# Patient Record
(demographics unavailable — no encounter records)

---
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
## 2018-08-25 NOTE — ASMTCMCOM
CM Note

 

CM Note                       

Notes:

Pt presented to the Emergency Department with continued abdominal/back pain. Asked to see pt by 

KAY Black regarding follow up appointment with [facility]. Met with pt 


- pt to see Dr. Manriquez on Sept 17th. Pt already saw her PCP last Thursday and has a referral for 

her upcoming GI appt. Serena requesting CM move GI appointment up from Sept 17th given pt's 

continued symptoms and three recent ED visits.



Call placed to [facility] [phone]. Left message with answering service 

requesting [facility] call pt on Monday 08/27/18 to move pt appointment to soonest date 

possible. Update provided to pt. Pt also provided with [facility] and Case Management's 

phone numbers to call for any further issues changing appointment date/time. Pt verbalized 

understanding. CM available for any further issues or concerns.

 

Date Signed:  08/25/2018 03:03 PM

Electronically Signed By:Cheli Contreras RN

## 2018-08-25 NOTE — EDPHY
H & P


Time Seen by Provider: 08/25/18 10:24


HPI/ROS: 





CHIEF COMPLAINT:  Abdominal pain





HISTORY OF PRESENT ILLNESS:  22-year-old female presents to the emergency 

department with abdominal pain.  She states that she has had ongoing abdominal 

pain for years however worse over last 1 week.  She was seen in the emergency 

department 1 week ago and had imaging studies which was within normal limits.  

She denies urinary symptoms.  She has also been seen by her primary care 

provider 2 days ago and is scheduled to see a gastroenterologist.  She 

currently denies pain or chest or difficulty breathing.  No vomiting.  She has 

had some diarrhea and loose bowel movements over last 1 week.  No antibiotics.  

No recent travel.  No known ill contacts.





REVIEW OF SYSTEMS:


Constitutional:  No fever, no chills.


Eyes:  No double or blurry vision.


ENT:  No sore throat.


Respiratory:  No cough, no shortness of breath.


Cardiac:  No chest pain.


Gastrointestinal:  Abdominal pain.  No vomiting.  Diarrhea


Genitourinary:  No dysuria.


Musculoskeletal:  No neck or back pain.


Skin:  No rashes.


Neurological:  No headache.


Past Medical/Surgical History: 





Traumatic brain injury, left-sided deficits chronic


Social History: 





 recently moved here from [place]


Smoking Status: Never smoked


Physical Exam: 





General Appearance:  Alert, no distress.  Afebrile.


Eyes:  Pupils equal and round.  Extraocular motions are all intact.


ENT:  Mouth:  Mucous membranes moist.


Respiratory:  No wheezing, rhonchi, or rales, lungs are clear to auscultation.


Cardiovascular:  Regular rate and rhythm.


Gastrointestinal:  Abdomen is soft and nontender, no masses, no rebound or 

guarding, bowel sounds normal.  Mild bilateral CVA tenderness worse on the 

right compared to the left.


Neurological:  Alert and oriented x 3, cranial nerves II through XII grossly 

intact


Skin:  Warm and dry, no rashes.


Musculoskeletal:  Nontender to palpate along the cervical, thoracic or lumbar 

spine.  Neck is supple.


Extremities:  Full range of motion and no peripheral edema.


Psychiatric:  Patient is oriented X 3, there is no agitation.


Constitutional: 


 Initial Vital Signs











Temperature (C)  36.8 C   08/25/18 10:26


 


Heart Rate  82   08/25/18 10:26


 


Respiratory Rate  18   08/25/18 10:26


 


Blood Pressure  93/65 L  08/25/18 10:26


 


O2 Sat (%)  96   08/25/18 10:26








 











O2 Delivery Mode               Room Air














Allergies/Adverse Reactions: 


 





latex Allergy (Verified 08/25/18 10:24)


 








Home Medications: 














 Medication  Instructions  Recorded


 


Dicyclomine [Bentyl 20 MG (*)] 20 mg PO QID PRN #15 tab 08/20/18


 


Hydrocodone/APAP 5/325 [Norco 1 - 2 tab PO Q4 PRN #10 tab 08/20/18





5/325 (RX)]  


 


Cephalexin [Keflex] 500 mg PO QID #40 cap 08/25/18














Medical Decision Making





- Diagnostics


Imaging Results: 


 Imaging Impressions





Abdomen X-Ray  08/25/18 11:50


Impression: Constipation. No obstruction.











Imaging: I viewed and interpreted images myself


ED Course/Re-evaluation: 





22-year-old female presents to the emergency department with chronic abdominal 

pain.  Patient's abdominal exam is unremarkable.  I do not think repeat CT 

imaging is indicated.  Do not think ultrasound is indicated.





Urinalysis reveals pyelonephritis.  Urine cultures pending.  She was given 1 g 

of ceftriaxone IV and will be discharged with oral Keflex.





Remainder laboratory exams are unremarkable.  She has a scheduled appointment 

with gastroenterologist in a few weeks.   will see if they are able 

to arrange for a sooner follow-up appointment.





Patient feels comfortable being discharged home.  She had no episodes of 

vomiting in the emergency department.  She is hungry and eating and drinking in 

the emergency department.





X-rays were obtained per her request which revealed evidence of constipation 

without evidence of obstruction.  She was encouraged to use high-fiber diet.


Differential Diagnosis: 





Including but not limited to constipation, gastroenteritis, GERD, peptic ulcer 

disease, cholecystitis, cholelithiasis





- Data Points


Laboratory Results: 


 Laboratory Results





 08/25/18 10:35 





 08/25/18 10:35 





 











  08/25/18 08/25/18 08/25/18





  11:12 10:35 10:35


 


WBC      





    


 


RBC      





    


 


Hgb      





    


 


Hct      





    


 


MCV      





    


 


MCH      





    


 


MCHC      





    


 


RDW      





    


 


Plt Count      





    


 


MPV      





    


 


Neut % (Auto)      





    


 


Lymph % (Auto)      





    


 


Mono % (Auto)      





    


 


Eos % (Auto)      





    


 


Baso % (Auto)      





    


 


Nucleat RBC Rel Count      





    


 


Absolute Neuts (auto)      





    


 


Absolute Lymphs (auto)      





    


 


Absolute Monos (auto)      





    


 


Absolute Eos (auto)      





    


 


Absolute Basos (auto)      





    


 


Absolute Nucleated RBC      





    


 


Immature Gran %      





    


 


Immature Gran #      





    


 


Sodium      142 mEq/L mEq/L





     (135-145) 


 


Potassium      4.5 mEq/L mEq/L





     (3.3-5.0) 


 


Chloride      109 mEq/L mEq/L





     () 


 


Carbon Dioxide      22 mEq/l mEq/l





     (22-31) 


 


Anion Gap      11 mEq/L mEq/L





     (8-16) 


 


BUN      10 mg/dL mg/dL





     (7-23) 


 


Creatinine      0.7 mg/dL mg/dL





     (0.6-1.0) 


 


Estimated GFR      > 60 





    


 


Glucose      88 mg/dL mg/dL





     () 


 


Calcium      10.0 mg/dL mg/dL





     (8.5-10.4) 


 


Beta HCG, Qual    NEGATIVE   





    


 


Urine Color  NICHOLAS     





    


 


Urine Appearance  MODERATELY TURBID     





    


 


Urine pH  5.0     





   (5.0-7.5)   


 


Ur Specific Gravity  1.024     





   (1.002-1.030)   


 


Urine Protein  NEGATIVE     





   (NEGATIVE)   


 


Urine Ketones  NEGATIVE     





   (NEGATIVE)   


 


Urine Blood  NEGATIVE     





   (NEGATIVE)   


 


Urine Nitrate  NEGATIVE     





   (NEGATIVE)   


 


Urine Bilirubin  NEGATIVE     





   (NEGATIVE)   


 


Urine Urobilinogen  2.0 EU H EU    





   (0.2-1.0)   


 


Ur Leukocyte Esterase  3+  H     





   (NEGATIVE)   


 


Urine RBC  10-15 /hpf H /hpf    





   (0-3)   


 


Urine WBC   /hpf H /hpf    





   (0-3)   


 


Ur Epithelial Cells  3+ /lpf H /lpf    





   (NONE-1+)   


 


Urine Bacteria  2+ /hpf H /hpf    





   (NONE SEEN)   


 


Urine Mucus  TRACE /lpf /lpf    





   (NONE-1+)   


 


Urine Glucose  NEGATIVE     





   (NEGATIVE)   














  08/25/18





  10:35


 


WBC  7.72 10^3/uL 10^3/uL





   (3.80-9.50) 


 


RBC  4.77 10^6/uL 10^6/uL





   (4.18-5.33) 


 


Hgb  13.3 g/dL g/dL





   (12.6-16.3) 


 


Hct  41.0 % %





   (38.0-47.0) 


 


MCV  86.0 fL fL





   (81.5-99.8) 


 


MCH  27.9 pg pg





   (27.9-34.1) 


 


MCHC  32.4 g/dL g/dL





   (32.4-36.7) 


 


RDW  17.3 % H %





   (11.5-15.2) 


 


Plt Count  402 10^3/uL H 10^3/uL





   (150-400) 


 


MPV  10.6 fL fL





   (8.7-11.7) 


 


Neut % (Auto)  51.0 % %





   (39.3-74.2) 


 


Lymph % (Auto)  36.8 % %





   (15.0-45.0) 


 


Mono % (Auto)  7.3 % %





   (4.5-13.0) 


 


Eos % (Auto)  4.3 % %





   (0.6-7.6) 


 


Baso % (Auto)  0.5 % %





   (0.3-1.7) 


 


Nucleat RBC Rel Count  0.0 % %





   (0.0-0.2) 


 


Absolute Neuts (auto)  3.94 10^3/uL 10^3/uL





   (1.70-6.50) 


 


Absolute Lymphs (auto)  2.84 10^3/uL 10^3/uL





   (1.00-3.00) 


 


Absolute Monos (auto)  0.56 10^3/uL 10^3/uL





   (0.30-0.80) 


 


Absolute Eos (auto)  0.33 10^3/uL 10^3/uL





   (0.03-0.40) 


 


Absolute Basos (auto)  0.04 10^3/uL 10^3/uL





   (0.02-0.10) 


 


Absolute Nucleated RBC  0.00 10^3/uL 10^3/uL





   (0-0.01) 


 


Immature Gran %  0.1 % %





   (0.0-1.1) 


 


Immature Gran #  0.01 10^3/uL 10^3/uL





   (0.00-0.10) 


 


Sodium  





  


 


Potassium  





  


 


Chloride  





  


 


Carbon Dioxide  





  


 


Anion Gap  





  


 


BUN  





  


 


Creatinine  





  


 


Estimated GFR  





  


 


Glucose  





  


 


Calcium  





  


 


Beta HCG, Qual  





  


 


Urine Color  





  


 


Urine Appearance  





  


 


Urine pH  





  


 


Ur Specific Gravity  





  


 


Urine Protein  





  


 


Urine Ketones  





  


 


Urine Blood  





  


 


Urine Nitrate  





  


 


Urine Bilirubin  





  


 


Urine Urobilinogen  





  


 


Ur Leukocyte Esterase  





  


 


Urine RBC  





  


 


Urine WBC  





  


 


Ur Epithelial Cells  





  


 


Urine Bacteria  





  


 


Urine Mucus  





  


 


Urine Glucose  





  











Medications Given: 


 








Discontinued Medications





Ceftriaxone Sodium/Dextrose (Rocephin 1 Gm (Premix))  50 mls @ 100 mls/hr IV 

EDNOW ONE


   PRN Reason: Protocol


   Stop: 08/25/18 12:19


   Last Admin: 08/25/18 12:01 Dose:  50 mls








Departure





- Departure


Disposition: Home, Routine, Self-Care


Clinical Impression: 


 Acute pyelonephritis





Condition: Good


Instructions:  Cephalexin (By mouth), Kidney Infection (ED)


Additional Instructions: 


Keflex 500 mg 4 times daily for 10 days.  Call [phone] for the results of 

your urine culture in 48 hr.  Return to the emergency department sooner if he 

developed fever, vomiting, worsening back pain, abdominal pain, or if you feel 

worse in any way.





Follow-up with [facility] as discussed.  They should be 

calling you on Monday to arrange for a sooner follow-up appointment.  If you 

are having trouble obtaining this appointment, please call our  in 

the emergency department.


Referrals: 


Taryn Mckeon MD [[facility] [place] - 2-3 days, if not improved (

Primary care provider on call)


Prescriptions: 


Cephalexin [Keflex] 500 mg PO QID #40 cap
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
